# Patient Record
Sex: MALE | Race: BLACK OR AFRICAN AMERICAN | NOT HISPANIC OR LATINO | URBAN - METROPOLITAN AREA
[De-identification: names, ages, dates, MRNs, and addresses within clinical notes are randomized per-mention and may not be internally consistent; named-entity substitution may affect disease eponyms.]

---

## 2018-08-07 ENCOUNTER — EMERGENCY (EMERGENCY)
Facility: HOSPITAL | Age: 33
LOS: 1 days | Discharge: HOME | End: 2018-08-07
Attending: STUDENT IN AN ORGANIZED HEALTH CARE EDUCATION/TRAINING PROGRAM
Payer: COMMERCIAL

## 2018-08-07 VITALS
HEIGHT: 70 IN | SYSTOLIC BLOOD PRESSURE: 145 MMHG | OXYGEN SATURATION: 100 % | HEART RATE: 68 BPM | RESPIRATION RATE: 18 BRPM | TEMPERATURE: 98 F | WEIGHT: 160.06 LBS | DIASTOLIC BLOOD PRESSURE: 86 MMHG

## 2018-08-07 PROCEDURE — 20605 DRAIN/INJ JOINT/BURSA W/O US: CPT

## 2018-08-07 PROCEDURE — 99284 EMERGENCY DEPT VISIT MOD MDM: CPT | Mod: 25

## 2018-08-07 PROCEDURE — 20605 DRAIN/INJ JOINT/BURSA W/O US: CPT | Mod: RT

## 2018-08-07 PROCEDURE — 99282 EMERGENCY DEPT VISIT SF MDM: CPT | Mod: 25

## 2018-08-08 RX ORDER — CEPHALEXIN 500 MG
1 CAPSULE ORAL
Qty: 28 | Refills: 0 | OUTPATIENT
Start: 2018-08-08 | End: 2018-08-14

## 2018-08-08 NOTE — ED PROVIDER NOTE - PROGRESS NOTE DETAILS
Consent obtained for arthocentesis, preformed arthocentesis Tubes sent after arthrocentesis labeled wrong, lab unable to process order due to incorrect labels with a patients similar name in ED, labels were done by anayeli desouza, incident report filled out, explained incident to patient, patient stating he got symptomatically relief with procedure and will follow up with orthopedics, ortho referral given, pt given strict instructions to return to the ED if obtain fever, spreading erythema, linear streaking Tubes sent after arthrocentesis labeled wrong, lab unable to process order due to incorrect labels with a patients similar name in ED, labels were done by anayeli desouza, incident report filled out, explained incident to patient, patient stating he got symptomatically relief with procedure and will follow up with orthopedics, start patient on keflex, ortho referral given, pt given strict instructions to return to the ED if obtain fever, spreading erythema, linear streaking

## 2018-08-08 NOTE — ED PROVIDER NOTE - ATTENDING CONTRIBUTION TO CARE
33yo male with no pmh presents with right elbow swelling and pain - ho of multiple minor bumps to elbow- no motor or sensory deficits, no fevers/chills, no other complaints. PE: notable for Tenderness over right elbow, FROM in all extremities, neurovascularly intact, radial pulse 2+, hand  5/5   Bursitis noted over right olecranon, warmth noted, no surrounding erythema, linear streaking. Dx: olecrenon bursitis r/o infectious (low likelyhood) vs inflammatory - arthrocentesis for fluid analysis and symptomatic relief, if tap neg, ortho follow up.

## 2018-08-08 NOTE — ED PROVIDER NOTE - OBJECTIVE STATEMENT
Patient is a 31 y/o male c/o of right elbow pain x 1 day. Patient reports hitting his elbow on surface multiple times recently. Patient states today he realized there was swelling to the right elbow. Patient admits to pain in the right elbow while moving the extremity. Patient denies IV drug use Patient is a 33 y/o male c/o of right elbow pain x 1 day. Patient reports hitting his elbow on surface multiple times recently. Patient states today he realized there was swelling to the right elbow. Patient admits to pain in the right elbow while moving the extremity. Patient denies IV drug use. Patient denies fever, numbness or loss of sensation.

## 2018-08-08 NOTE — ED PROVIDER NOTE - PHYSICAL EXAMINATION
Const: Awake, alert and oriented. In no acute distress. Well appearing.  HEENT: NC/AT. Moist mucous membranes.  Eyes: No scleral icterus. EOMI.  Neck:. Soft and supple. Full ROM without pain.  Cardiac: +S1/S2. No murmurs.   Resp: Speaking in full sentences. No evidence of respiratory distress. No wheezes, rales or rhonchi.  Abd: Soft, non-tender, non-distended. Normal bowel sounds in all 4 quadrants. No guarding or rebound.  Back: Spine midline and non-tender. No CVAT.  MSK: Tenderness over right elbow, FROM in all extremities, neurovascularly intact, radial pulse 2+, hand  5/5   Skin: Bursitis noted over right olecranon, warmth noted, no surrounding erythema, linear streaking   Lymph: No cervical lymphadenopathy.  Neuro: Awake, alert & oriented x 3. Moves all extremities symmetrically. Const: Awake, alert and oriented. In no acute distress. Well appearing.  HEENT: NC/AT. Moist mucous membranes.  Eyes: No scleral icterus. EOMI.  Neck:. Soft and supple. Full ROM without pain.  Cardiac: +S1/S2. RRR  Resp: Speaking in full sentences. No evidence of respiratory distress. No wheezes, rales or rhonchi.  Abd: Soft, non-tender, non-distended. Normal bowel sounds in all 4 quadrants. No guarding or rebound.  Back: Spine midline and non-tender. No CVAT.  MSK: Tenderness over right elbow, FROM in all extremities, neurovascularly intact, radial pulse 2+, hand  5/5   Skin: Bursitis noted over right olecranon, warmth noted, no surrounding erythema, linear streaking   Lymph: No cervical lymphadenopathy.  Neuro: Awake, alert & oriented x 3. Moves all extremities symmetrically.

## 2018-08-13 PROBLEM — Z00.00 ENCOUNTER FOR PREVENTIVE HEALTH EXAMINATION: Status: ACTIVE | Noted: 2018-08-13

## 2018-08-28 ENCOUNTER — APPOINTMENT (OUTPATIENT)
Dept: ORTHOPEDIC SURGERY | Facility: CLINIC | Age: 33
End: 2018-08-28
Payer: MEDICAID

## 2018-08-28 VITALS
DIASTOLIC BLOOD PRESSURE: 81 MMHG | WEIGHT: 160 LBS | SYSTOLIC BLOOD PRESSURE: 122 MMHG | HEART RATE: 66 BPM | BODY MASS INDEX: 22.9 KG/M2 | HEIGHT: 70 IN

## 2018-08-28 DIAGNOSIS — Z78.9 OTHER SPECIFIED HEALTH STATUS: ICD-10-CM

## 2018-08-28 DIAGNOSIS — M70.21 OLECRANON BURSITIS, RIGHT ELBOW: ICD-10-CM

## 2018-08-28 PROCEDURE — 99204 OFFICE O/P NEW MOD 45 MIN: CPT

## 2018-08-28 PROCEDURE — 73080 X-RAY EXAM OF ELBOW: CPT | Mod: RT

## 2018-08-28 RX ORDER — CEPHALEXIN 500 MG/1
500 CAPSULE ORAL
Refills: 0 | Status: ACTIVE | COMMUNITY

## 2020-10-29 ENCOUNTER — TRANSCRIPTION ENCOUNTER (OUTPATIENT)
Age: 35
End: 2020-10-29

## 2021-07-26 ENCOUNTER — APPOINTMENT (OUTPATIENT)
Dept: ORTHOPEDIC SURGERY | Facility: CLINIC | Age: 36
End: 2021-07-26
